# Patient Record
Sex: FEMALE | Race: WHITE | Employment: OTHER | URBAN - METROPOLITAN AREA
[De-identification: names, ages, dates, MRNs, and addresses within clinical notes are randomized per-mention and may not be internally consistent; named-entity substitution may affect disease eponyms.]

---

## 2017-08-14 ENCOUNTER — OFFICE VISIT (OUTPATIENT)
Dept: DERMATOLOGY | Age: 67
End: 2017-08-14

## 2017-08-14 DIAGNOSIS — D22.5 NEVUS OF LOWER BACK: ICD-10-CM

## 2017-08-14 DIAGNOSIS — L56.5 DISSEMINATED SUPERFICIAL ACTINIC POROKERATOSIS (DSAP): Primary | ICD-10-CM

## 2017-08-14 PROCEDURE — 99213 OFFICE O/P EST LOW 20 MIN: CPT | Performed by: DERMATOLOGY

## 2018-08-13 ENCOUNTER — OFFICE VISIT (OUTPATIENT)
Dept: DERMATOLOGY | Age: 68
End: 2018-08-13

## 2018-08-13 DIAGNOSIS — L82.1 SK (SEBORRHEIC KERATOSIS): Primary | ICD-10-CM

## 2018-08-13 DIAGNOSIS — L56.5 DISSEMINATED SUPERFICIAL ACTINIC POROKERATOSIS (DSAP): ICD-10-CM

## 2018-08-13 PROCEDURE — 99213 OFFICE O/P EST LOW 20 MIN: CPT | Performed by: DERMATOLOGY

## 2019-08-15 ENCOUNTER — OFFICE VISIT (OUTPATIENT)
Dept: DERMATOLOGY | Age: 69
End: 2019-08-15
Payer: COMMERCIAL

## 2019-08-15 DIAGNOSIS — L82.1 SK (SEBORRHEIC KERATOSIS): Primary | ICD-10-CM

## 2019-08-15 DIAGNOSIS — L56.5 DSAP (DISSEMINATED SUPERFICIAL ACTINIC POROKERATOSIS): ICD-10-CM

## 2019-08-15 PROCEDURE — 99213 OFFICE O/P EST LOW 20 MIN: CPT | Performed by: DERMATOLOGY

## 2019-08-15 NOTE — PROGRESS NOTES
[facility]  Sheron Estevez MD  [phone]      Debbie Keys  1950    81 y.o. female     Date of Visit: 8/15/2019    Chief Complaint: skin lesions    History of Present Illness:    1. She presents today for new onset lesion on the left medial cheek. 2.  Follow-up for history of disseminated superficial actinic porokeratosis-remain stable and asymptomatic. Review of Systems:  Skin: No new or changing moles. Past Medical History, Family History, Surgical History, Medications and Allergies reviewed. Past Medical History:   Diagnosis Date    Hyperlipidemia     Hypothyroidism     Mood disorder ([facility] [address]      Past Surgical History:   Procedure Laterality Date    HYSTERECTOMY         No Known Allergies  No outpatient medications have been marked as taking for the 8/15/19 encounter (Office Visit) with Suzanne Peng MD.         Physical Examination       The following were examined and determined to be normal: Psych/Neuro, Scalp/hair, Head/face, Conjunctivae/eyelids, Gums/teeth/lips, Neck, Breast/axilla/chest, Abdomen, Back and Nails/digits. The following were examined and determined to be abnormal: RUE, LUE, RLE and LLE. Well appearing. 1.  Left medial cheek - stuck on appearing pink papule. 2.  Extremities with numerous round and annular pink macules and patches with a thread like keratotic rim. Assessment and Plan      1. SK (seborrheic keratosis)     2 cycles of liquid nitrogen applied to 1 lesion on the left cheek (no charge). Patient was educated regarding the potential risks of blister formation, discomfort, hypopigmentation, and scar. Wound care was discussed. 2. DSAP (disseminated superficial actinic porokeratosis) - numerous    Continue sun protective behaviors. Observe. Return in about 1 year (around 8/15/2020).

## 2019-11-28 ENCOUNTER — APPOINTMENT (OUTPATIENT)
Dept: GENERAL RADIOLOGY | Age: 69
End: 2019-11-28
Payer: MEDICARE

## 2019-11-28 ENCOUNTER — HOSPITAL ENCOUNTER (EMERGENCY)
Age: 69
Discharge: HOME | End: 2019-11-28
Payer: MEDICARE

## 2019-11-28 VITALS
DIASTOLIC BLOOD PRESSURE: 86 MMHG | SYSTOLIC BLOOD PRESSURE: 175 MMHG | BODY MASS INDEX: 35.61 KG/M2 | HEIGHT: 63 IN | WEIGHT: 201 LBS | TEMPERATURE: 98.3 F | OXYGEN SATURATION: 96 % | HEART RATE: 84 BPM | RESPIRATION RATE: 16 BRPM

## 2019-11-28 DIAGNOSIS — M25.571 ACUTE RIGHT ANKLE PAIN: ICD-10-CM

## 2019-11-28 DIAGNOSIS — S93.491A: Primary | ICD-10-CM

## 2019-11-28 DIAGNOSIS — S92.154A CLOSED NONDISPLACED AVULSION FRACTURE OF RIGHT TALUS, INITIAL ENCOUNTER: ICD-10-CM

## 2019-11-28 PROCEDURE — 99283 EMERGENCY DEPT VISIT LOW MDM: CPT

## 2019-11-28 PROCEDURE — 73610 X-RAY EXAM OF ANKLE: CPT

## 2019-11-28 PROCEDURE — 6370000000 HC RX 637 (ALT 250 FOR IP): Performed by: NURSE PRACTITIONER

## 2019-11-28 RX ORDER — IBUPROFEN 800 MG/1
800 TABLET ORAL EVERY 8 HOURS PRN
Qty: 20 TABLET | Refills: 0 | Status: SHIPPED | OUTPATIENT
Start: 2019-11-28

## 2019-11-28 RX ORDER — HYDROCODONE BITARTRATE AND ACETAMINOPHEN 5; 325 MG/1; MG/1
1 TABLET ORAL EVERY 6 HOURS PRN
Qty: 6 TABLET | Refills: 0 | Status: SHIPPED | OUTPATIENT
Start: 2019-11-28 | End: 2019-11-30

## 2019-11-28 RX ORDER — HYDROCODONE BITARTRATE AND ACETAMINOPHEN 5; 325 MG/1; MG/1
1 TABLET ORAL ONCE
Status: COMPLETED | OUTPATIENT
Start: 2019-11-28 | End: 2019-11-28

## 2019-11-28 RX ADMIN — HYDROCODONE BITARTRATE AND ACETAMINOPHEN 1 TABLET: 5; 325 TABLET ORAL at 21:32

## 2019-11-28 ASSESSMENT — PAIN DESCRIPTION - DESCRIPTORS: DESCRIPTORS: ACHING

## 2019-11-28 ASSESSMENT — PAIN DESCRIPTION - LOCATION: LOCATION: ANKLE

## 2019-11-28 ASSESSMENT — PAIN DESCRIPTION - ORIENTATION: ORIENTATION: RIGHT

## 2019-11-28 ASSESSMENT — PAIN DESCRIPTION - PAIN TYPE: TYPE: ACUTE PAIN

## 2019-11-28 ASSESSMENT — ENCOUNTER SYMPTOMS
NAUSEA: 0
VOMITING: 0

## 2019-11-28 ASSESSMENT — PAIN DESCRIPTION - ONSET: ONSET: ON-GOING

## 2019-11-28 ASSESSMENT — PAIN DESCRIPTION - FREQUENCY: FREQUENCY: CONTINUOUS

## 2019-11-28 ASSESSMENT — PAIN DESCRIPTION - PROGRESSION: CLINICAL_PROGRESSION: GRADUALLY WORSENING

## 2019-11-28 ASSESSMENT — PAIN SCALES - GENERAL
PAINLEVEL_OUTOF10: 8
PAINLEVEL_OUTOF10: 8

## 2020-08-17 ENCOUNTER — OFFICE VISIT (OUTPATIENT)
Dept: DERMATOLOGY | Age: 70
End: 2020-08-17
Payer: MEDICARE

## 2020-08-17 VITALS — TEMPERATURE: 97.5 F

## 2020-08-17 PROCEDURE — 99213 OFFICE O/P EST LOW 20 MIN: CPT | Performed by: DERMATOLOGY

## 2020-08-17 NOTE — PROGRESS NOTES
[facility]  Josephine Garcia MD  [phone]      Onel Davison  1950    78 y.o. female     Date of Visit: 8/17/2020    Chief Complaint: skin lesions    **DR. Mar patient**    History of Present Illness:    1. Here for evaluation of scattered asx pigmented lesions on the trunk and extremities, present for many years; no change in size/shape/color of any lesions; no bleeding lesions. 2.  Follow-up for history of disseminated superficial actinic porokeratosis-remain stable and asymptomatic. No personal hx of skin cancer; no family hx of melanoma but + hx of NMSC in her sister who has had a kidney transplant and father with hx of NMSC. Review of Systems:  Gen: feels well; no F/C  Skin: No new or changing moles. Past Medical History, Family History, Surgical History, Medications and Allergies reviewed. Past Medical History:   Diagnosis Date    Hyperlipidemia     Hypothyroidism     Mood disorder ([address]      Past Surgical History:   Procedure Laterality Date    HYSTERECTOMY         No Known Allergies  Outpatient Medications Marked as Taking for the 8/17/20 encounter (Office Visit) with Kenia Millan MD   Medication Sig Dispense Refill    ibuprofen (ADVIL;MOTRIN) 800 MG tablet Take 1 tablet by mouth every 8 hours as needed for Pain or Fever 20 tablet 0    escitalopram (LEXAPRO) 10 MG tablet Take 10 mg by mouth daily      levothyroxine (SYNTHROID) 25 MCG tablet Take 25 mcg by mouth Daily      phentermine (ADIPEX-P) 37.5 MG tablet Take 37.5 mg by mouth every morning (before breakfast).  calcium carbonate (TUMS) 500 MG chewable tablet Take 2 tablets by mouth daily.  GEMFIBROZIL PO Take 100 mg by mouth daily. Physical Examination       The following were examined and determined to be normal: Psych/Neuro, Scalp/hair, Head/face, Conjunctivae/eyelids, Gums/teeth/lips, Neck, Breast/axilla/chest, Abdomen, Back and Nails/digits.     The following were examined and determined to be abnormal: RUE, LUE, RLE and LLE. Well appearing. 1.  trunk and extremities with scattered brown macules and papules     2. Extremities with numerous round and annular pink macules and patches with a thread like keratotic rim. Assessment and Plan      1. Few benign-appearing nevi, lentigines and SK (seborrheic keratosis)   - educ re ABCD's of MM   educ sun protection   encouraged skin check yearly (sooner if indicated), self checks     2. DSAP (disseminated superficial actinic porokeratosis) - numerous  - Continue sun protective behaviors. - monitor for bleeding, pain, concerning changes        Return in about 1 year (around 8/17/2021).

## 2021-08-17 ENCOUNTER — OFFICE VISIT (OUTPATIENT)
Dept: DERMATOLOGY | Age: 71
End: 2021-08-17
Payer: MEDICARE

## 2021-08-17 VITALS — TEMPERATURE: 97.6 F

## 2021-08-17 DIAGNOSIS — L82.1 SK (SEBORRHEIC KERATOSIS): ICD-10-CM

## 2021-08-17 DIAGNOSIS — D22.9 MULTIPLE NEVI: ICD-10-CM

## 2021-08-17 DIAGNOSIS — L56.5 DSAP (DISSEMINATED SUPERFICIAL ACTINIC POROKERATOSIS): Primary | ICD-10-CM

## 2021-08-17 PROCEDURE — 99213 OFFICE O/P EST LOW 20 MIN: CPT | Performed by: DERMATOLOGY

## 2021-08-17 NOTE — PROGRESS NOTES
[facility]  Lynn Zamora MD  [phone]      Fanny Basilio  1950    73 y.o. female     Date of Visit: 8/17/2021    Chief Complaint: skin moles    History of Present Illness:    1. Follow-up for history of disseminated superficial actinic porokeratosislesions remain. None are bothersome. 2.  She also complains of a lesion on the right thigh that is same symptomatic. 3.  She has several moles on the trunknot of any changes in size, color, or shape. Review of Systems:  Gen: Feels well, good sense of health. Past Medical History, Family History, Surgical History, Medications and Allergies reviewed. Past Medical History:   Diagnosis Date    Hyperlipidemia     Hypothyroidism     Mood disorder ([address]      Past Surgical History:   Procedure Laterality Date    HYSTERECTOMY         No Known Allergies  Outpatient Medications Marked as Taking for the 8/17/21 encounter (Office Visit) with Afsaneh Turpin MD   Medication Sig Dispense Refill    ibuprofen (ADVIL;MOTRIN) 800 MG tablet Take 1 tablet by mouth every 8 hours as needed for Pain or Fever 20 tablet 0    escitalopram (LEXAPRO) 10 MG tablet Take 10 mg by mouth daily      levothyroxine (SYNTHROID) 25 MCG tablet Take 25 mcg by mouth Daily      calcium carbonate (TUMS) 500 MG chewable tablet Take 2 tablets by mouth daily.  GEMFIBROZIL PO Take 100 mg by mouth daily. Physical Examination       The following were examined and determined to be normal: Psych/Neuro, Scalp/hair, Head/face, Conjunctivae/eyelids, Gums/teeth/lips, Neck, Breast/axilla/chest, Abdomen, Back and Nails/digits. The following were examined and determined to be abnormal: RUE, LUE, RLE and LLE. Well appearing. 1.  Extensor extremities with multiple round and annular pink macules with a threadlike keratotic rim. 2.  Right distal anterior lateral thigh with a round verrucous tan papule.     3.  Trunk with several scattered well-defined round oval uniformly brown macules and few papules. Assessment and Plan     1. DSAP (disseminated superficial actinic porokeratosis)     Continue sun protective behaviors including use of at least SPF 30 sunscreen. 2. SK (seborrheic keratosis)     Reassurance. 3. Multiple nevi - benign appearing    Sun protective behaviors, including use of at least SPF 30 sunscreen, and self skin examinations were encouraged. Call for any new or concerning lesions. Return in about 1 year (around 8/17/2022).     --Joann Clayton MD

## 2022-08-18 ENCOUNTER — OFFICE VISIT (OUTPATIENT)
Dept: DERMATOLOGY | Age: 72
End: 2022-08-18
Payer: MEDICARE

## 2022-08-18 DIAGNOSIS — D22.5 NEVUS OF BACK: ICD-10-CM

## 2022-08-18 DIAGNOSIS — L56.5 DSAP (DISSEMINATED SUPERFICIAL ACTINIC POROKERATOSIS): Primary | ICD-10-CM

## 2022-08-18 PROCEDURE — 99213 OFFICE O/P EST LOW 20 MIN: CPT | Performed by: DERMATOLOGY

## 2022-08-18 PROCEDURE — 1123F ACP DISCUSS/DSCN MKR DOCD: CPT | Performed by: DERMATOLOGY

## 2022-08-18 NOTE — PROGRESS NOTES
[facility]  Aimee Lopez MD  [phone]      Giovanni Book  1950    81 y.o. female     Date of Visit: 8/18/2022    Chief Complaint: skin lesions    History of Present Illness:    1. She returns today to follow-up for history of disseminated superficial actinic porokeratosis. She reports stable asymptomatic lesions on the extremities today. 2.  She also reports a raised asymptomatic lesion on the left lower back.  passed away 1 month ago. Review of Systems:  Gen: Feels well, good sense of health. Skin: No new or changing moles. Past Medical History, Family History, Surgical History, Medications and Allergies reviewed. Past Medical History:   Diagnosis Date    Hyperlipidemia     Hypothyroidism     Mood disorder ([facility] [address]      Past Surgical History:   Procedure Laterality Date    HYSTERECTOMY (CERVIX STATUS UNKNOWN)         No Known Allergies  Outpatient Medications Marked as Taking for the 8/18/22 encounter (Office Visit) with Maame Sevilla MD   Medication Sig Dispense Refill    ibuprofen (ADVIL;MOTRIN) 800 MG tablet Take 1 tablet by mouth every 8 hours as needed for Pain or Fever 20 tablet 0    escitalopram (LEXAPRO) 10 MG tablet Take 10 mg by mouth daily      levothyroxine (SYNTHROID) 25 MCG tablet Take 25 mcg by mouth Daily      calcium carbonate (TUMS) 500 MG chewable tablet Take 2 tablets by mouth daily. GEMFIBROZIL PO Take 100 mg by mouth daily. Physical Examination       The following were examined and determined to be normal: Psych/Neuro, Scalp/hair, Head/face, Conjunctivae/eyelids, Gums/teeth/lips, Neck, Breast/axilla/chest, Abdomen, Back, RUE, LUE, RLE, LLE, and Nails/digits. The following were examined and determined to be abnormal: None. Well-appearing. 1.  Scattered on the extensor surfaces the extremities are multiple round and oval shaped pink macules with a threadlike keratotic rim.     2.  Left lower back with a round raised soft smooth skin colored papule. Assessment and Plan     1. DSAP (disseminated superficial actinic porokeratosis) -stable and asymptomatic    Observe. Encouraged sun protective behaviors including use of least SPF 30 or more sunscreen. 2. Nevus of back -benign-appearing    Reassurance. Return in about 1 year (around 8/18/2023).     --Denny Dunne MD

## 2023-08-24 ENCOUNTER — OFFICE VISIT (OUTPATIENT)
Dept: DERMATOLOGY | Age: 73
End: 2023-08-24
Payer: MEDICARE

## 2023-08-24 DIAGNOSIS — L56.5 DSAP (DISSEMINATED SUPERFICIAL ACTINIC POROKERATOSIS): Primary | ICD-10-CM

## 2023-08-24 PROCEDURE — 99213 OFFICE O/P EST LOW 20 MIN: CPT | Performed by: DERMATOLOGY

## 2023-08-24 PROCEDURE — 1123F ACP DISCUSS/DSCN MKR DOCD: CPT | Performed by: DERMATOLOGY

## 2023-08-24 RX ORDER — CHOLESTEROL
POWDER (GRAM) MISCELLANEOUS
Qty: 60 G | Refills: 2 | Status: SHIPPED | OUTPATIENT
Start: 2023-08-24

## 2023-08-24 NOTE — PROGRESS NOTES
1. DSAP (disseminated superficial actinic porokeratosis)     Cholesterol 2% lovastatin 2% cream ([employer]) twice daily. Continue sun protective behaviors. Return in about 1 year (around 8/24/2024).     --Madan Bray MD

## 2024-08-26 ENCOUNTER — OFFICE VISIT (OUTPATIENT)
Dept: DERMATOLOGY | Age: 74
End: 2024-08-26
Payer: MEDICARE

## 2024-08-26 DIAGNOSIS — L56.5 DSAP (DISSEMINATED SUPERFICIAL ACTINIC POROKERATOSIS): Primary | ICD-10-CM

## 2024-08-26 DIAGNOSIS — L82.1 SK (SEBORRHEIC KERATOSIS): ICD-10-CM

## 2024-08-26 PROCEDURE — 1123F ACP DISCUSS/DSCN MKR DOCD: CPT | Performed by: DERMATOLOGY

## 2024-08-26 PROCEDURE — 99213 OFFICE O/P EST LOW 20 MIN: CPT | Performed by: DERMATOLOGY

## 2024-08-26 RX ORDER — CHOLESTEROL
POWDER (GRAM) MISCELLANEOUS
Qty: 60 G | Refills: 2 | Status: SHIPPED | OUTPATIENT
Start: 2024-08-26

## 2024-08-26 NOTE — PROGRESS NOTES
[facility]  Esdras Mar MD  [phone]      Kanwal Cook  1950    74 y.o. female     Date of Visit: 8/26/2024    Chief Complaint: skin lesions    History of Present Illness:    1.  She returns today for disseminated superficial actinic porokeratosis of the extremities.  She reports some improvement with use of compounded lovastatin cholesterol cream.    2.  She also reports a newly noted growth on the right thigh.      Review of Systems:  Gen: Feels well, good sense of health.    Past Medical History, Family History, Surgical History, Medications and Allergies reviewed.    Past Medical History:   Diagnosis Date    Hyperlipidemia     Hypothyroidism     Mood disorder (HCC)      Past Surgical History:   Procedure Laterality Date    HYSTERECTOMY (CERVIX STATUS UNKNOWN)         No Known Allergies  Outpatient Medications Marked as Taking for the 8/26/24 encounter (Office Visit) with Esdras Mar MD   Medication Sig Dispense Refill    Cholesterol POWD Apply twice daily to affected areas.  SM42 cholesterol 2% / lovastatin 2% cream. 60 g 2    ibuprofen (ADVIL;MOTRIN) 800 MG tablet Take 1 tablet by mouth every 8 hours as needed for Pain or Fever 20 tablet 0    escitalopram (LEXAPRO) 10 MG tablet Take 1 tablet by mouth daily      levothyroxine (SYNTHROID) 25 MCG tablet Take 1 tablet by mouth Daily      calcium carbonate (TUMS) 500 MG chewable tablet Take 2 tablets by mouth daily      GEMFIBROZIL PO Take 100 mg by mouth daily.      Cholesterol POWD Apply twice daily to affected areas.  SM42 cholesterol 2% / lovastatin 2% cream. 60 g 2           Physical Examination       Well appearing.    1.  Extremities with scattered small round pink macules and patches with a threadlike keratotic rim.    2.  Right distal anterior thigh with a stuck on appearing verrucous brown papule.         Assessment and Plan     1. DSAP (disseminated superficial actinic porokeratosis) - improved    Continue  sun protection.    Continue use of compounded cholesterol and lovastatin cream twice daily.     2. SK (seborrheic keratosis)     Reassurance.          Return in about 1 year (around 8/26/2025).    --Esdras Mar MD

## 2025-08-26 ENCOUNTER — OFFICE VISIT (OUTPATIENT)
Age: 75
End: 2025-08-26
Payer: MEDICARE

## 2025-08-26 ENCOUNTER — PATIENT MESSAGE (OUTPATIENT)
Age: 75
End: 2025-08-26

## 2025-08-26 DIAGNOSIS — L72.0 EPIDERMOID CYST: ICD-10-CM

## 2025-08-26 DIAGNOSIS — L56.5 DSAP (DISSEMINATED SUPERFICIAL ACTINIC POROKERATOSIS): Primary | ICD-10-CM

## 2025-08-26 PROCEDURE — 1123F ACP DISCUSS/DSCN MKR DOCD: CPT | Performed by: DERMATOLOGY

## 2025-08-26 PROCEDURE — 99213 OFFICE O/P EST LOW 20 MIN: CPT | Performed by: DERMATOLOGY

## 2025-08-26 PROCEDURE — 1159F MED LIST DOCD IN RCRD: CPT | Performed by: DERMATOLOGY

## 2025-08-26 RX ORDER — METFORMIN HYDROCHLORIDE 500 MG/1
500 TABLET, EXTENDED RELEASE ORAL
COMMUNITY
Start: 2025-04-07 | End: 2025-10-04

## 2025-08-26 RX ORDER — LORAZEPAM 1 MG/1
TABLET ORAL
COMMUNITY
Start: 2025-07-31

## 2025-08-27 RX ORDER — CHOLESTEROL
POWDER (GRAM) MISCELLANEOUS
Qty: 60 G | Refills: 2 | Status: SHIPPED | OUTPATIENT
Start: 2025-08-27